# Patient Record
Sex: MALE | Race: ASIAN | NOT HISPANIC OR LATINO | Employment: OTHER | URBAN - METROPOLITAN AREA
[De-identification: names, ages, dates, MRNs, and addresses within clinical notes are randomized per-mention and may not be internally consistent; named-entity substitution may affect disease eponyms.]

---

## 2024-05-14 ENCOUNTER — TELEPHONE (OUTPATIENT)
Dept: PRIMARY CARE CLINIC | Facility: CLINIC | Age: 59
End: 2024-05-14

## 2024-05-14 NOTE — TELEPHONE ENCOUNTER
----- Message from Manjeet Louise sent at 5/14/2024 10:59 AM CDT -----      Name of Who is Calling: CHARLOTTE BRUSH [[id number]]      What is the request in detail: Pt called said he was referred to the provider and would like a call back.Please contact to further discuss and advise.          Can the clinic reply by [facility]: Y      What Number to [facility] Back [facility]: [phone]